# Patient Record
Sex: MALE | Race: BLACK OR AFRICAN AMERICAN | NOT HISPANIC OR LATINO | Employment: FULL TIME | URBAN - METROPOLITAN AREA
[De-identification: names, ages, dates, MRNs, and addresses within clinical notes are randomized per-mention and may not be internally consistent; named-entity substitution may affect disease eponyms.]

---

## 2022-10-16 ENCOUNTER — HOSPITAL ENCOUNTER (EMERGENCY)
Facility: HOSPITAL | Age: 47
Discharge: HOME | End: 2022-10-16
Attending: STUDENT IN AN ORGANIZED HEALTH CARE EDUCATION/TRAINING PROGRAM
Payer: MEDICAID

## 2022-10-16 VITALS
HEART RATE: 66 BPM | SYSTOLIC BLOOD PRESSURE: 130 MMHG | BODY MASS INDEX: 40.4 KG/M2 | RESPIRATION RATE: 18 BRPM | OXYGEN SATURATION: 99 % | WEIGHT: 257.94 LBS | DIASTOLIC BLOOD PRESSURE: 78 MMHG | TEMPERATURE: 97 F

## 2022-10-16 DIAGNOSIS — N50.811 PAIN IN BOTH TESTICLES: ICD-10-CM

## 2022-10-16 DIAGNOSIS — Z20.2 POSSIBLE EXPOSURE TO STD: ICD-10-CM

## 2022-10-16 DIAGNOSIS — N50.812 PAIN IN BOTH TESTICLES: ICD-10-CM

## 2022-10-16 DIAGNOSIS — M54.50 ACUTE BILATERAL LOW BACK PAIN WITHOUT SCIATICA: Primary | ICD-10-CM

## 2022-10-16 LAB
BILIRUB UR QL STRIP: NEGATIVE
CLARITY UR: CLEAR
COLOR UR: YELLOW
GLUCOSE UR QL STRIP: NEGATIVE
HGB UR QL STRIP: ABNORMAL
KETONES UR QL STRIP: NEGATIVE
LEUKOCYTE ESTERASE UR QL STRIP: NEGATIVE
NITRITE UR QL STRIP: NEGATIVE
PH UR STRIP: 6 [PH] (ref 5–8)
PROT UR QL STRIP: NEGATIVE
SP GR UR STRIP: 1.02 (ref 1–1.03)
URN SPEC COLLECT METH UR: ABNORMAL
UROBILINOGEN UR STRIP-ACNC: 1 EU/DL

## 2022-10-16 PROCEDURE — 81003 URINALYSIS AUTO W/O SCOPE: CPT | Performed by: STUDENT IN AN ORGANIZED HEALTH CARE EDUCATION/TRAINING PROGRAM

## 2022-10-16 PROCEDURE — 87591 N.GONORRHOEAE DNA AMP PROB: CPT | Performed by: STUDENT IN AN ORGANIZED HEALTH CARE EDUCATION/TRAINING PROGRAM

## 2022-10-16 PROCEDURE — 99284 EMERGENCY DEPT VISIT MOD MDM: CPT

## 2022-10-16 PROCEDURE — 96372 THER/PROPH/DIAG INJ SC/IM: CPT | Performed by: STUDENT IN AN ORGANIZED HEALTH CARE EDUCATION/TRAINING PROGRAM

## 2022-10-16 PROCEDURE — 63600175 PHARM REV CODE 636 W HCPCS: Performed by: STUDENT IN AN ORGANIZED HEALTH CARE EDUCATION/TRAINING PROGRAM

## 2022-10-16 PROCEDURE — 87491 CHLMYD TRACH DNA AMP PROBE: CPT | Performed by: STUDENT IN AN ORGANIZED HEALTH CARE EDUCATION/TRAINING PROGRAM

## 2022-10-16 RX ORDER — CYCLOBENZAPRINE HCL 10 MG
10 TABLET ORAL 3 TIMES DAILY PRN
Qty: 15 TABLET | Refills: 0 | Status: SHIPPED | OUTPATIENT
Start: 2022-10-16 | End: 2022-10-21

## 2022-10-16 RX ORDER — IBUPROFEN 800 MG/1
800 TABLET ORAL EVERY 6 HOURS PRN
Qty: 20 TABLET | Refills: 0 | Status: SHIPPED | OUTPATIENT
Start: 2022-10-16

## 2022-10-16 RX ORDER — CEFTRIAXONE 1 G/1
1 INJECTION, POWDER, FOR SOLUTION INTRAMUSCULAR; INTRAVENOUS
Status: COMPLETED | OUTPATIENT
Start: 2022-10-16 | End: 2022-10-16

## 2022-10-16 RX ORDER — KETOROLAC TROMETHAMINE 30 MG/ML
15 INJECTION, SOLUTION INTRAMUSCULAR; INTRAVENOUS
Status: COMPLETED | OUTPATIENT
Start: 2022-10-16 | End: 2022-10-16

## 2022-10-16 RX ORDER — DOXYCYCLINE 100 MG/1
100 CAPSULE ORAL 2 TIMES DAILY
Qty: 20 CAPSULE | Refills: 0 | Status: SHIPPED | OUTPATIENT
Start: 2022-10-16 | End: 2022-10-26

## 2022-10-16 RX ADMIN — KETOROLAC TROMETHAMINE 15 MG: 30 INJECTION, SOLUTION INTRAMUSCULAR; INTRAVENOUS at 08:10

## 2022-10-16 RX ADMIN — CEFTRIAXONE SODIUM 1 G: 1 INJECTION, POWDER, FOR SOLUTION INTRAMUSCULAR; INTRAVENOUS at 08:10

## 2022-10-17 LAB
C TRACH DNA SPEC QL NAA+PROBE: NOT DETECTED
N GONORRHOEA DNA SPEC QL NAA+PROBE: NOT DETECTED

## 2022-10-17 NOTE — ED PROVIDER NOTES
Encounter Date: 10/16/2022    This document was partially completed using speech recognition software and may contain misspellings, grammatical errors, and/or unexpected word substitutions.       History     Chief Complaint   Patient presents with    Back Pain     Patient to ER CC of lower back pain for a month, denies any trauma      47 year old male presents to the ED with bilateral lower back pain and bilateral testicular pain for the past month. 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 No falls, no traumas, no car wrecks. States he was sexually active with a female partner and did use condoms. No dysuria, frequency, difficulty urinating, hematuria. States both testicles hurt and ache. No trauma to  the scrotum. When asked if there was concern for possible STD, patient states it's possible.      Review of patient's allergies indicates:  No Known Allergies  History reviewed. No pertinent past medical history.  History reviewed. No pertinent surgical history.  History reviewed. No pertinent family history.  Social History     Tobacco Use    Smoking status: Never    Smokeless tobacco: Current   Substance Use Topics    Alcohol use: Yes     Comment: socially    Drug use: No     Review of Systems   Constitutional:  Negative for chills and fever.   HENT:  Negative for congestion, rhinorrhea and sneezing.    Eyes:  Negative for discharge and redness.   Respiratory:  Negative for cough and shortness of breath.    Cardiovascular:  Negative for chest pain and palpitations.   Gastrointestinal:  Negative for abdominal pain, diarrhea and vomiting.   Genitourinary:  Positive for testicular pain. Negative for difficulty urinating, flank pain and urgency.   Musculoskeletal:  Positive for back pain. Negative for neck pain.   Skin:  Negative for rash and wound.   Neurological:  Negative for weakness, numbness and headaches.     Physical Exam     Initial Vitals [10/16/22 2023]   BP Pulse Resp  Temp SpO2   130/78 66 18 96.9 °F (36.1 °C) 99 %      MAP       --         Physical Exam    Nursing note and vitals reviewed.  Constitutional: He appears well-developed. He is not diaphoretic. No distress.   HENT:   Head: Normocephalic and atraumatic.   Right Ear: External ear normal.   Left Ear: External ear normal.   Nose: Nose normal.   Eyes: Conjunctivae are normal. Right eye exhibits no discharge. Left eye exhibits no discharge. No scleral icterus.   Cardiovascular:  Normal rate and regular rhythm.           Pulmonary/Chest: Breath sounds normal. No stridor. No respiratory distress. He has no wheezes. He has no rhonchi. He has no rales.   Abdominal: Abdomen is soft. He exhibits no distension. There is no abdominal tenderness. There is no guarding.   Genitourinary: Cremasteric reflex is present. Right testis shows tenderness. Right testis shows no mass and no swelling. Left testis shows tenderness. Left testis shows no mass and no swelling. Uncircumcised.   Musculoskeletal:         General: No edema.      Lumbar back: Tenderness present. No bony tenderness.      Comments: Ambulatory     Neurological: He is alert and oriented to person, place, and time. GCS score is 15. GCS eye subscore is 4. GCS verbal subscore is 5. GCS motor subscore is 6.   Skin: Skin is warm and dry. Capillary refill takes less than 2 seconds.   Psychiatric: He has a normal mood and affect.       ED Course   Procedures  Labs Reviewed   URINALYSIS, REFLEX TO URINE CULTURE - Abnormal; Notable for the following components:       Result Value    Occult Blood UA Trace (*)     All other components within normal limits    Narrative:     Specimen Source->Urine   C. TRACHOMATIS/N. GONORRHOEAE BY AMP DNA          Imaging Results    None          Medications   ketorolac injection 15 mg (has no administration in time range)   cefTRIAXone injection 1 g (has no administration in time range)     Medical Decision Making:   Differential Diagnosis:   Ddx: DUC  back pain, STD, testicular torsion, orchitis, epidimitis    ED Management:  Based on the patient's evaluation - patient appears well for discharge home. Atraumatic low back pain - treating with toradol and discharging with ibuprofen, flexeril, supportive care. In regards to his bilateral testicular pain for a week - low concern for bilateral testicular torsion. Was sexually active with potential concern for STD - neg UA but pending gonorrhea/chlamydia results - patient elects with empiric treatment with ceftriaxone and doxycycline instead of waiting for those results. PCP f/u advised. Patient is in agreement.                        Clinical Impression:   Final diagnoses:  [M54.50] Acute bilateral low back pain without sciatica (Primary)  [N50.811, N50.812] Pain in both testicles  [Z20.2] Possible exposure to STD      ED Disposition Condition    Discharge Stable          ED Prescriptions       Medication Sig Dispense Start Date End Date Auth. Provider    ibuprofen (ADVIL,MOTRIN) 800 MG tablet Take 1 tablet (800 mg total) by mouth every 6 (six) hours as needed for Pain. 20 tablet 10/16/2022 -- Morro Sky DO    cyclobenzaprine (FLEXERIL) 10 MG tablet Take 1 tablet (10 mg total) by mouth 3 (three) times daily as needed for Muscle spasms. 15 tablet 10/16/2022 10/21/2022 Morro kSy DO    doxycycline (VIBRAMYCIN) 100 MG Cap Take 1 capsule (100 mg total) by mouth 2 (two) times daily. for 10 days 20 capsule 10/16/2022 10/26/2022 Morro Sky DO          Follow-up Information       Follow up With Specialties Details Why Contact Info    Your primary care provider  Schedule an appointment as soon as possible for a visit in 1 week As needed              Morro Sky DO  10/16/22 2050

## 2023-06-17 ENCOUNTER — HOSPITAL ENCOUNTER (EMERGENCY)
Facility: HOSPITAL | Age: 48
Discharge: HOME | End: 2023-06-17
Attending: EMERGENCY MEDICINE
Payer: MEDICAID

## 2023-06-17 VITALS
DIASTOLIC BLOOD PRESSURE: 89 MMHG | HEART RATE: 76 BPM | TEMPERATURE: 98 F | OXYGEN SATURATION: 100 % | SYSTOLIC BLOOD PRESSURE: 159 MMHG | RESPIRATION RATE: 18 BRPM

## 2023-06-17 DIAGNOSIS — M25.511 ACUTE PAIN OF RIGHT SHOULDER: Primary | ICD-10-CM

## 2023-06-17 PROCEDURE — 99284 EMERGENCY DEPT VISIT MOD MDM: CPT

## 2023-06-17 RX ORDER — CYCLOBENZAPRINE HCL 10 MG
10 TABLET ORAL 3 TIMES DAILY PRN
Qty: 15 TABLET | Refills: 0 | Status: SHIPPED | OUTPATIENT
Start: 2023-06-17 | End: 2023-06-22

## 2023-06-17 RX ORDER — KETOROLAC TROMETHAMINE 10 MG/1
10 TABLET, FILM COATED ORAL EVERY 6 HOURS
Qty: 20 TABLET | Refills: 0 | Status: SHIPPED | OUTPATIENT
Start: 2023-06-17 | End: 2023-06-22

## 2023-06-17 NOTE — ED PROVIDER NOTES
Encounter Date: 6/17/2023       History     Chief Complaint   Patient presents with    underarm pain      Patient states everytime he lays down on the right side or puts right arm too high its sends a shock x2 weeks     48-year-old male presents to the emergency room with right shoulder pain for the last 2 weeks.  Patient states when he raises his right arm above shoulder line he received a shocking pain.  Denies any trauma, heavy lifting, injury.  Patient states worsens when he tries to sleep on his right shoulder or raises his right arm above shoulder line.      Review of patient's allergies indicates:  No Known Allergies  No past medical history on file.  No past surgical history on file.  No family history on file.  Social History     Tobacco Use    Smoking status: Never    Smokeless tobacco: Current   Substance Use Topics    Alcohol use: Yes     Comment: socially    Drug use: No     Review of Systems   Constitutional:  Negative for fever.   HENT:  Negative for sore throat.    Respiratory:  Negative for shortness of breath.    Cardiovascular:  Negative for chest pain.   Gastrointestinal:  Negative for nausea.   Genitourinary:  Negative for dysuria.   Musculoskeletal:  Positive for arthralgias. Negative for back pain.   Skin:  Negative for rash.   Neurological:  Negative for weakness.   Hematological:  Does not bruise/bleed easily.   All other systems reviewed and are negative.    Physical Exam     Initial Vitals [06/17/23 1341]   BP Pulse Resp Temp SpO2   (!) 159/89 76 18 98 °F (36.7 °C) 100 %      MAP       --         Physical Exam    Nursing note and vitals reviewed.  Constitutional: He appears well-developed and well-nourished.   HENT:   Head: Normocephalic and atraumatic.   Eyes: Pupils are equal, round, and reactive to light.   Abdominal: Abdomen is soft.   Musculoskeletal:      Comments: Pain elicited with range of motion of right arm above shoulder line and crossover maneuver.  Full range of motion      Neurological: He is alert and oriented to person, place, and time.   Psychiatric: He has a normal mood and affect.       ED Course   Procedures  Labs Reviewed - No data to display       Imaging Results    None          Medications - No data to display  Medical Decision Making:   Differential Diagnosis:   Rotator cuff injury, pinched nerve, paresthesia, right shoulder pain                        Clinical Impression:   Final diagnoses:  [M25.511] Acute pain of right shoulder (Primary)        ED Disposition Condition    Discharge Stable          ED Prescriptions       Medication Sig Dispense Start Date End Date Auth. Provider    cyclobenzaprine (FLEXERIL) 10 MG tablet Take 1 tablet (10 mg total) by mouth 3 (three) times daily as needed for Muscle spasms. 15 tablet 6/17/2023 6/22/2023 Gisela Cheney NP    ketorolac (TORADOL) 10 mg tablet Take 1 tablet (10 mg total) by mouth every 6 (six) hours. for 5 days 20 tablet 6/17/2023 6/22/2023 Gisela Cheney NP          Follow-up Information    None          Gisela Cheney NP  06/17/23 8245

## 2024-05-30 ENCOUNTER — HOSPITAL ENCOUNTER (EMERGENCY)
Facility: HOSPITAL | Age: 49
Discharge: HOME | End: 2024-05-30
Attending: SURGERY
Payer: COMMERCIAL

## 2024-05-30 VITALS
OXYGEN SATURATION: 98 % | TEMPERATURE: 99 F | SYSTOLIC BLOOD PRESSURE: 128 MMHG | HEART RATE: 60 BPM | WEIGHT: 264.69 LBS | DIASTOLIC BLOOD PRESSURE: 69 MMHG | BODY MASS INDEX: 41.45 KG/M2 | RESPIRATION RATE: 16 BRPM

## 2024-05-30 DIAGNOSIS — M25.552 LEFT HIP PAIN: ICD-10-CM

## 2024-05-30 DIAGNOSIS — F45.41 PSYCHOGENIC PAIN: Primary | ICD-10-CM

## 2024-05-30 DIAGNOSIS — F14.10 COCAINE ABUSE: ICD-10-CM

## 2024-05-30 DIAGNOSIS — L73.2 HIDRADENITIS SUPPURATIVA OF RIGHT AXILLA: ICD-10-CM

## 2024-05-30 DIAGNOSIS — M79.601 PAIN OF RIGHT ARM: ICD-10-CM

## 2024-05-30 LAB
ALBUMIN SERPL BCP-MCNC: 3.3 G/DL (ref 3.5–5.2)
ALP SERPL-CCNC: 59 U/L (ref 55–135)
ALT SERPL W/O P-5'-P-CCNC: 19 U/L (ref 10–44)
AMPHET+METHAMPHET UR QL: NEGATIVE
ANION GAP SERPL CALC-SCNC: 8 MMOL/L (ref 8–16)
AST SERPL-CCNC: 14 U/L (ref 10–40)
BARBITURATES UR QL SCN>200 NG/ML: NEGATIVE
BASOPHILS # BLD AUTO: 0.04 K/UL (ref 0–0.2)
BASOPHILS NFR BLD: 0.5 % (ref 0–1.9)
BENZODIAZ UR QL SCN>200 NG/ML: NEGATIVE
BILIRUB SERPL-MCNC: 0.5 MG/DL (ref 0.1–1)
BILIRUB UR QL STRIP: NEGATIVE
BNP SERPL-MCNC: 24 PG/ML (ref 0–99)
BUN SERPL-MCNC: 18 MG/DL (ref 6–20)
BZE UR QL SCN: ABNORMAL
CALCIUM SERPL-MCNC: 9.1 MG/DL (ref 8.7–10.5)
CANNABINOIDS UR QL SCN: ABNORMAL
CHLORIDE SERPL-SCNC: 108 MMOL/L (ref 95–110)
CLARITY UR: CLEAR
CO2 SERPL-SCNC: 25 MMOL/L (ref 23–29)
COLOR UR: YELLOW
CREAT SERPL-MCNC: 1 MG/DL (ref 0.5–1.4)
CREAT UR-MCNC: 153.3 MG/DL (ref 23–375)
DIFFERENTIAL METHOD BLD: NORMAL
EOSINOPHIL # BLD AUTO: 0.2 K/UL (ref 0–0.5)
EOSINOPHIL NFR BLD: 2.9 % (ref 0–8)
ERYTHROCYTE [DISTWIDTH] IN BLOOD BY AUTOMATED COUNT: 12.8 % (ref 11.5–14.5)
EST. GFR  (NO RACE VARIABLE): >60 ML/MIN/1.73 M^2
GLUCOSE SERPL-MCNC: 88 MG/DL (ref 70–110)
GLUCOSE UR QL STRIP: NEGATIVE
HCT VFR BLD AUTO: 42.6 % (ref 40–54)
HGB BLD-MCNC: 14 G/DL (ref 14–18)
HGB UR QL STRIP: ABNORMAL
IMM GRANULOCYTES # BLD AUTO: 0.01 K/UL (ref 0–0.04)
IMM GRANULOCYTES NFR BLD AUTO: 0.1 % (ref 0–0.5)
KETONES UR QL STRIP: NEGATIVE
LEUKOCYTE ESTERASE UR QL STRIP: NEGATIVE
LIPASE SERPL-CCNC: 301 U/L (ref 4–60)
LYMPHOCYTES # BLD AUTO: 3.5 K/UL (ref 1–4.8)
LYMPHOCYTES NFR BLD: 44.1 % (ref 18–48)
MCH RBC QN AUTO: 30 PG (ref 27–31)
MCHC RBC AUTO-ENTMCNC: 32.9 G/DL (ref 32–36)
MCV RBC AUTO: 91 FL (ref 82–98)
METHADONE UR QL SCN>300 NG/ML: NEGATIVE
MONOCYTES # BLD AUTO: 0.7 K/UL (ref 0.3–1)
MONOCYTES NFR BLD: 8.3 % (ref 4–15)
NEUTROPHILS # BLD AUTO: 3.5 K/UL (ref 1.8–7.7)
NEUTROPHILS NFR BLD: 44.1 % (ref 38–73)
NITRITE UR QL STRIP: NEGATIVE
NRBC BLD-RTO: 0 /100 WBC
OPIATES UR QL SCN: NEGATIVE
PCP UR QL SCN>25 NG/ML: NEGATIVE
PH UR STRIP: 6 [PH] (ref 5–8)
PLATELET # BLD AUTO: 243 K/UL (ref 150–450)
PMV BLD AUTO: 10 FL (ref 9.2–12.9)
POTASSIUM SERPL-SCNC: 3.8 MMOL/L (ref 3.5–5.1)
PROT SERPL-MCNC: 7 G/DL (ref 6–8.4)
PROT UR QL STRIP: NEGATIVE
RBC # BLD AUTO: 4.66 M/UL (ref 4.6–6.2)
SODIUM SERPL-SCNC: 141 MMOL/L (ref 136–145)
SP GR UR STRIP: 1.02 (ref 1–1.03)
TOXICOLOGY INFORMATION: ABNORMAL
TROPONIN I SERPL DL<=0.01 NG/ML-MCNC: <0.006 NG/ML (ref 0–0.03)
URN SPEC COLLECT METH UR: ABNORMAL
UROBILINOGEN UR STRIP-ACNC: 1 EU/DL
WBC # BLD AUTO: 7.91 K/UL (ref 3.9–12.7)

## 2024-05-30 PROCEDURE — 99900031 HC PATIENT EDUCATION (STAT)

## 2024-05-30 PROCEDURE — 81003 URINALYSIS AUTO W/O SCOPE: CPT | Mod: 59 | Performed by: SURGERY

## 2024-05-30 PROCEDURE — 63600175 PHARM REV CODE 636 W HCPCS: Performed by: NURSE PRACTITIONER

## 2024-05-30 PROCEDURE — 80053 COMPREHEN METABOLIC PANEL: CPT | Performed by: SURGERY

## 2024-05-30 PROCEDURE — 84484 ASSAY OF TROPONIN QUANT: CPT | Performed by: SURGERY

## 2024-05-30 PROCEDURE — 93005 ELECTROCARDIOGRAM TRACING: CPT

## 2024-05-30 PROCEDURE — 99285 EMERGENCY DEPT VISIT HI MDM: CPT | Mod: 25

## 2024-05-30 PROCEDURE — 93010 ELECTROCARDIOGRAM REPORT: CPT | Mod: ,,, | Performed by: INTERNAL MEDICINE

## 2024-05-30 PROCEDURE — 85025 COMPLETE CBC W/AUTO DIFF WBC: CPT | Performed by: SURGERY

## 2024-05-30 PROCEDURE — 83880 ASSAY OF NATRIURETIC PEPTIDE: CPT | Performed by: SURGERY

## 2024-05-30 PROCEDURE — 83690 ASSAY OF LIPASE: CPT | Performed by: SURGERY

## 2024-05-30 PROCEDURE — 80307 DRUG TEST PRSMV CHEM ANLYZR: CPT | Performed by: SURGERY

## 2024-05-30 PROCEDURE — 99900035 HC TECH TIME PER 15 MIN (STAT)

## 2024-05-30 PROCEDURE — 96372 THER/PROPH/DIAG INJ SC/IM: CPT | Performed by: NURSE PRACTITIONER

## 2024-05-30 RX ORDER — CLINDAMYCIN HYDROCHLORIDE 300 MG/1
300 CAPSULE ORAL EVERY 6 HOURS
Qty: 28 CAPSULE | Refills: 0 | Status: SHIPPED | OUTPATIENT
Start: 2024-05-30 | End: 2024-06-06

## 2024-05-30 RX ORDER — KETOROLAC TROMETHAMINE 30 MG/ML
30 INJECTION, SOLUTION INTRAMUSCULAR; INTRAVENOUS
Status: COMPLETED | OUTPATIENT
Start: 2024-05-30 | End: 2024-05-30

## 2024-05-30 RX ORDER — TIZANIDINE 4 MG/1
4 TABLET ORAL EVERY 8 HOURS PRN
Qty: 12 TABLET | Refills: 0 | Status: SHIPPED | OUTPATIENT
Start: 2024-05-30

## 2024-05-30 RX ORDER — NAPROXEN 500 MG/1
500 TABLET ORAL EVERY 12 HOURS PRN
Qty: 10 TABLET | Refills: 0 | Status: SHIPPED | OUTPATIENT
Start: 2024-05-30

## 2024-05-30 RX ADMIN — KETOROLAC TROMETHAMINE 30 MG: 30 INJECTION INTRAMUSCULAR; INTRAVENOUS at 09:05

## 2024-05-31 LAB
OHS QRS DURATION: 74 MS
OHS QTC CALCULATION: 363 MS

## 2024-05-31 NOTE — ED PROVIDER NOTES
Encounter Date: 5/30/2024       History     Chief Complaint   Patient presents with    Arm Pain     PT TO ER WITH C/O RIGHT AXILLARY PAIN AND AND LEFT HIP PAIN.      Popeye Correa is a 49 y.o. male  with PMH of RA presenting to the ED for evaluation of right axillary pain and left hip pain.   Patient presents with pain to his right axilla that started 2-3 days ago.  He denies any injury or lesions.  Pain is described as aching, exacerbated by light touch, currently rated 5/10 in severity.  He also presents with left hip pain that is chronic.  He reports that he has a history of RA.  He denies any new injury or trauma.  Pain is described as aching, exacerbated by movement and weight-bearing, currently rated 8/10 in severity.  He denies numbness or tingling to left lower extremity.    The history is provided by the patient.     Review of patient's allergies indicates:  No Known Allergies  Past Medical History:   Diagnosis Date    Rheumatoid arthritis, unspecified      No past surgical history on file.  No family history on file.  Social History     Tobacco Use    Smoking status: Never    Smokeless tobacco: Current   Substance Use Topics    Alcohol use: Yes     Comment: socially    Drug use: No     Review of Systems   Constitutional:  Negative for appetite change, chills and fever.   HENT:  Negative for congestion, ear discharge, ear pain, postnasal drip, rhinorrhea and sore throat.    Respiratory:  Negative for cough, chest tightness and shortness of breath.    Cardiovascular:  Negative for chest pain.   Gastrointestinal:  Negative for abdominal distention, abdominal pain and nausea.   Genitourinary:  Negative for dysuria, flank pain, hematuria and urgency.   Musculoskeletal:  Positive for arthralgias (Left hip, right axilla). Negative for back pain.   Skin: Negative.  Negative for rash.   Neurological:  Negative for dizziness, weakness, numbness and headaches.   Hematological:  Does not bruise/bleed easily.        Physical Exam     Initial Vitals [05/30/24 1906]   BP Pulse Resp Temp SpO2   128/69 60 16 98.7 °F (37.1 °C) 98 %      MAP       --         Physical Exam    Nursing note and vitals reviewed.  Constitutional: He appears well-developed and well-nourished.   HENT:   Head: Normocephalic and atraumatic.   Eyes: Conjunctivae and EOM are normal. Pupils are equal, round, and reactive to light.   Neck: Neck supple.   Cardiovascular:  Normal rate, regular rhythm, normal heart sounds and intact distal pulses.           Pulmonary/Chest: Breath sounds normal.   Abdominal: Abdomen is soft. Bowel sounds are normal.   Musculoskeletal:      Cervical back: Neck supple.      Left hip: Tenderness present. Decreased range of motion.     Neurological: He is alert and oriented to person, place, and time. He has normal strength.   Skin: Skin is warm and dry.   R axilla with small areas of induration without obvious lesions or fluctuance   Psychiatric: He has a normal mood and affect. His behavior is normal. Judgment and thought content normal.         ED Course   Procedures  Labs Reviewed   COMPREHENSIVE METABOLIC PANEL - Abnormal; Notable for the following components:       Result Value    Albumin 3.3 (*)     All other components within normal limits   LIPASE - Abnormal; Notable for the following components:    Lipase 301 (*)     All other components within normal limits   URINALYSIS, REFLEX TO URINE CULTURE - Abnormal; Notable for the following components:    Occult Blood UA Trace (*)     All other components within normal limits    Narrative:     Specimen Source->Urine   DRUG SCREEN PANEL, URINE EMERGENCY - Abnormal; Notable for the following components:    Cocaine (Metab.) Presumptive Positive (*)     THC Presumptive Positive (*)     All other components within normal limits    Narrative:     Specimen Source->Urine   CBC W/ AUTO DIFFERENTIAL   TROPONIN I   B-TYPE NATRIURETIC PEPTIDE          Imaging Results              X-Ray Hip  2 or 3 views Left with Pelvis when performed (Final result)  Result time 05/30/24 19:54:35      Final result by Patrice Hopkins,  (05/30/24 19:54:35)                   Impression:      No acute fracture or dislocation.      Electronically signed by: Patrice Hopkins  Date:    05/30/2024  Time:    19:54               Narrative:    EXAMINATION:  XR HIP WITH PELVIS WHEN PERFORMED 2 OR 3 VIEWS LEFT    CLINICAL HISTORY:  Pain in left hip    TECHNIQUE:  AP view of the pelvis and frog leg lateral view of the left hip were performed.    COMPARISON:  None    FINDINGS:  There is no acute fracture or dislocation.  Alignment is normal.  The femoral heads are well seated in the acetabula.  Joint spaces are preserved.                                       Medications   ketorolac injection 30 mg (has no administration in time range)     Medical Decision Making   Evaluation of a 49-year-old male with right axilla pain and left hip pain.   Right axilla with small areas of induration, likely hidradenitis.  No active drainage   Chronic left hip pain with no new injury or trauma. +  mild tenderness.  Good distal pulses.   Differential diagnosis includes hidradenitis, abscess, cellulitis,  bursitis, osteoarthritis    Amount and/or Complexity of Data Reviewed  Labs: ordered. Decision-making details documented in ED Course.  Radiology: ordered. Decision-making details documented in ED Course.  ECG/medicine tests: ordered and independent interpretation performed.     Details:  Sinus bradycardia, rate 55.  Normal TX and QRS intervals.  No STEMI.    No previous EKG for comparison    Risk  Prescription drug management.  Risk Details:  Stable for discharge home.  Patient presented with right axilla pain and atraumatic left hip pain.  Patient concerned that he is having heart issues due to pain in his axilla.  On exam, he has obvious hidradenitis;  but given concern, cardiac workup was completed that is negative.  Left hip x-ray shows no acute  findings.  He was given Toradol for pain control and will be discharged home with clindamycin for hidradenitis, tizanidine, and naproxen for pain control.  Patient also tested positive for cocaine.   Patient extensively counseled on adverse effects of drug use. Patient/caregiver voices understanding and feels comfortable with discharge plan.      The patient acknowledges that close follow up with medical provider is required. Instructed to follow up with PCP within 2 days. Patient was given specific return precautions. The patient agrees to comply with all instruction and directions given in the ER.                                        Clinical Impression:  Final diagnoses:  [M79.601] Pain of right arm  [M25.552] Left hip pain  [L73.2] Hidradenitis suppurativa of right axilla (Primary)  [F14.10] Cocaine abuse          ED Disposition Condition    Discharge Stable          ED Prescriptions       Medication Sig Dispense Start Date End Date Auth. Provider    tiZANidine (ZANAFLEX) 4 MG tablet Take 1 tablet (4 mg total) by mouth every 8 (eight) hours as needed (pain). 12 tablet 5/30/2024 -- Roya Jurado NP    naproxen (NAPROSYN) 500 MG tablet Take 1 tablet (500 mg total) by mouth every 12 (twelve) hours as needed (pain). Take with food. 10 tablet 5/30/2024 -- Roya Jurado NP    clindamycin (CLEOCIN) 300 MG capsule Take 1 capsule (300 mg total) by mouth every 6 (six) hours. for 7 days 28 capsule 5/30/2024 6/6/2024 Roya Juraod NP          Follow-up Information       Follow up With Specialties Details Why Contact Info    PCP  Schedule an appointment as soon as possible for a visit in 2 days               Roya Jurado NP  05/30/24 3417

## 2025-03-28 ENCOUNTER — HOSPITAL ENCOUNTER (EMERGENCY)
Facility: HOSPITAL | Age: 50
Discharge: HOME | End: 2025-03-28
Attending: STUDENT IN AN ORGANIZED HEALTH CARE EDUCATION/TRAINING PROGRAM
Payer: COMMERCIAL

## 2025-03-28 VITALS
BODY MASS INDEX: 34.56 KG/M2 | DIASTOLIC BLOOD PRESSURE: 79 MMHG | OXYGEN SATURATION: 97 % | SYSTOLIC BLOOD PRESSURE: 128 MMHG | HEART RATE: 84 BPM | TEMPERATURE: 100 F | RESPIRATION RATE: 22 BRPM | WEIGHT: 220.69 LBS

## 2025-03-28 DIAGNOSIS — B34.9 VIRAL SYNDROME: Primary | ICD-10-CM

## 2025-03-28 LAB
INFLUENZA A MOLECULAR (OHS): NEGATIVE
INFLUENZA B MOLECULAR (OHS): NEGATIVE
SARS-COV-2 RDRP RESP QL NAA+PROBE: NEGATIVE

## 2025-03-28 PROCEDURE — U0002 COVID-19 LAB TEST NON-CDC: HCPCS | Performed by: STUDENT IN AN ORGANIZED HEALTH CARE EDUCATION/TRAINING PROGRAM

## 2025-03-28 PROCEDURE — 99284 EMERGENCY DEPT VISIT MOD MDM: CPT | Mod: 25

## 2025-03-28 PROCEDURE — 96372 THER/PROPH/DIAG INJ SC/IM: CPT

## 2025-03-28 PROCEDURE — 87502 INFLUENZA DNA AMP PROBE: CPT | Performed by: STUDENT IN AN ORGANIZED HEALTH CARE EDUCATION/TRAINING PROGRAM

## 2025-03-28 PROCEDURE — 63600175 PHARM REV CODE 636 W HCPCS: Mod: JZ,TB

## 2025-03-28 RX ORDER — METHYLPREDNISOLONE 4 MG/1
TABLET ORAL
Qty: 21 EACH | Refills: 0 | Status: SHIPPED | OUTPATIENT
Start: 2025-03-28

## 2025-03-28 RX ORDER — FLUTICASONE PROPIONATE 50 MCG
1 SPRAY, SUSPENSION (ML) NASAL 2 TIMES DAILY PRN
Qty: 15 G | Refills: 1 | Status: SHIPPED | OUTPATIENT
Start: 2025-03-28 | End: 2025-04-27

## 2025-03-28 RX ORDER — DICLOFENAC SODIUM 50 MG/1
50 TABLET, DELAYED RELEASE ORAL 3 TIMES DAILY PRN
Qty: 15 TABLET | Refills: 0 | Status: SHIPPED | OUTPATIENT
Start: 2025-03-28

## 2025-03-28 RX ORDER — KETOROLAC TROMETHAMINE 30 MG/ML
30 INJECTION, SOLUTION INTRAMUSCULAR; INTRAVENOUS
Status: COMPLETED | OUTPATIENT
Start: 2025-03-28 | End: 2025-03-28

## 2025-03-28 RX ADMIN — KETOROLAC TROMETHAMINE 30 MG: 30 INJECTION, SOLUTION INTRAMUSCULAR at 03:03

## 2025-03-28 NOTE — ED PROVIDER NOTES
Encounter Date: 3/28/2025       History     Chief Complaint   Patient presents with    General Illness     Pt arrives to the ed with c/o no taste or smell and body aches x 2 days.     This note is dictated on M*Modal word recognition program.  There are word recognition mistakes and grammatical errors that are occasionally missed on review.     Popeye Correa is a 50 y.o. male with a history rheumatoid arthritis presents to ER today with complaints of body aches, chills, no taste or smell for the last 48 hours concerned he has COVID-19.  No shortness of breath no chest pain reported      The history is provided by the patient.     Review of patient's allergies indicates:  No Known Allergies  Past Medical History:   Diagnosis Date    Rheumatoid arthritis, unspecified      History reviewed. No pertinent surgical history.  No family history on file.  Social History[1]  Review of Systems   HENT:          Patient reports decreased sense of taste   Musculoskeletal:  Positive for arthralgias and myalgias.       Physical Exam     Initial Vitals [03/28/25 1413]   BP Pulse Resp Temp SpO2   (!) 149/93 97 20 98.2 °F (36.8 °C) 97 %      MAP       --         Physical Exam    Constitutional: He appears well-developed and well-nourished. He is not diaphoretic. No distress.   HENT:   Head: Normocephalic.   Throat is erythemic   Eyes: Pupils are equal, round, and reactive to light.   Neck: Neck supple.   Normal range of motion.  Cardiovascular:  Normal rate, regular rhythm and normal heart sounds.           Pulmonary/Chest: Breath sounds normal. No respiratory distress. He has no wheezes.   Abdominal: Bowel sounds are normal.   Musculoskeletal:         General: No tenderness or edema.      Cervical back: Normal range of motion and neck supple.     Neurological: He is alert and oriented to person, place, and time. GCS score is 15. GCS eye subscore is 4. GCS verbal subscore is 5. GCS motor subscore is 6.   Skin: Capillary refill takes  less than 2 seconds. No rash noted. No erythema.   Psychiatric: He has a normal mood and affect. His behavior is normal. Judgment and thought content normal.         ED Course   Procedures  Labs Reviewed   INFLUENZA A & B BY MOLECULAR - Normal       Result Value    INFLUENZA A MOLECULAR Negative      INFLUENZA B MOLECULAR  Negative     SARS-COV-2 RNA AMPLIFICATION, QUAL - Normal    SARS COV-2 Molecular Negative            Imaging Results    None          Medications   ketorolac injection 30 mg (has no administration in time range)     Medical Decision Making  Differential diagnosis include COVID-19, influenza, viral upper respiratory infection, viral syndrome    Patient tested negative for COVID negative for influenza  Symptoms are consistent with viral syndrome causing body aches, chills and nasal congestion.  Will treat patient's symptomatically otherwise vital signs stable no hypoxia  Patient stable at time of discharge in no acute distress.  No life-threatening illnesses were found during ER visit today.  Patient was instructed to follow-up with PCP or other recommended specialist within the next 48-72 hours.  Patient was instructed to return to ER immediately for any worsening or concerning symptoms.  All discharge instructions discussed with patient, and patient agrees to comply with discharge instructions given today.     Risk  Prescription drug management.                                      Clinical Impression:  Final diagnoses:  [B34.9] Viral syndrome (Primary)          ED Disposition Condition    Discharge Stable          ED Prescriptions       Medication Sig Dispense Start Date End Date Auth. Provider    diclofenac (VOLTAREN) 50 MG EC tablet Take 1 tablet (50 mg total) by mouth 3 (three) times daily as needed (Pain). 15 tablet 3/28/2025 -- Neville Mosher NP    methylPREDNISolone (MEDROL DOSEPACK) 4 mg tablet use as directed 21 each 3/28/2025 -- Neville Mosher NP    fluticasone propionate (FLONASE) 50  mcg/actuation nasal spray 1 spray (50 mcg total) by Each Nostril route 2 (two) times daily as needed for Rhinitis or Allergies. 15 g 3/28/2025 4/27/2025 Neville Mosher, NP          Follow-up Information    None              [1]   Social History  Tobacco Use    Smoking status: Never    Smokeless tobacco: Current   Substance Use Topics    Alcohol use: Yes     Comment: socially    Drug use: No        Neville Mosher, NP  03/28/25 2128

## 2025-03-28 NOTE — Clinical Note
"Popeye"Yuridia Correa was seen and treated in our emergency department on 3/28/2025.  He may return to work on 03/31/2025.       If you have any questions or concerns, please don't hesitate to call.      Neville Mosher, NP"

## 2025-03-28 NOTE — DISCHARGE INSTRUCTIONS
Please alternate Tylenol and Motrin to help control body aches.  Your COVID and influenza test were negative today

## 2025-05-25 ENCOUNTER — HOSPITAL ENCOUNTER (EMERGENCY)
Facility: HOSPITAL | Age: 50
Discharge: HOME | End: 2025-05-25
Attending: SURGERY
Payer: COMMERCIAL

## 2025-05-25 VITALS
HEIGHT: 67 IN | SYSTOLIC BLOOD PRESSURE: 134 MMHG | BODY MASS INDEX: 40.69 KG/M2 | RESPIRATION RATE: 20 BRPM | OXYGEN SATURATION: 99 % | HEART RATE: 60 BPM | DIASTOLIC BLOOD PRESSURE: 95 MMHG | TEMPERATURE: 98 F | WEIGHT: 259.25 LBS

## 2025-05-25 DIAGNOSIS — S20.224A CONTUSION OF MIDDLE BACK WALL OF THORAX, INITIAL ENCOUNTER: Primary | ICD-10-CM
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
## 2025-05-25 PROCEDURE — 99283 EMERGENCY DEPT VISIT LOW MDM: CPT | Mod: 25

## 2025-05-25 PROCEDURE — 25000003 PHARM REV CODE 250: Performed by: SURGERY

## 2025-05-25 RX ORDER — IBUPROFEN 800 MG/1
800 TABLET, FILM COATED ORAL
Status: DISCONTINUED | OUTPATIENT
Start: 2025-05-25 | End: 2025-05-25 | Stop reason: HOSPADM

## 2025-05-25 RX ORDER — IBUPROFEN 800 MG/1
800 TABLET, FILM COATED ORAL EVERY 6 HOURS PRN
Qty: 20 TABLET | Refills: 0 | Status: SHIPPED | OUTPATIENT
Start: 2025-05-25

## 2025-05-25 NOTE — ED PROVIDER NOTES
Encounter Date: 5/25/2025       History     Chief Complaint   Patient presents with    Back Pain     Patient to ED alone complaining of upper back pain after a slip and fall on 5/22.     55-year-old man who fell last week at a store and hurt his upper back.  His pain is localized in 1 spot and he has pain when he uses his back muscles he has no respiratory symptoms or chest pain he suffered no other injuries during the fall      Back Pain   This is a new problem. The current episode started two days ago. The problem occurs intermittently. The problem has been unchanged. The pain is associated with falling. The pain is present in the thoracic spine. The quality of the pain is described as stabbing. The pain does not radiate. The pain is at a severity of 5/10. The symptoms are aggravated by bending and twisting.     Review of patient's allergies indicates:  No Known Allergies  Past Medical History:   Diagnosis Date    Rheumatoid arthritis, unspecified      History reviewed. No pertinent surgical history.  No family history on file.  Social History[1]  Review of Systems   Constitutional: Negative.    HENT: Negative.     Eyes: Negative.    Respiratory: Negative.     Cardiovascular: Negative.    Gastrointestinal:  Negative for abdominal distention and diarrhea.   Endocrine: Negative.    Genitourinary: Negative.    Musculoskeletal:  Positive for back pain.   Allergic/Immunologic: Negative.    Hematological: Negative.    Psychiatric/Behavioral: Negative.         Physical Exam     Initial Vitals [05/25/25 1149]   BP Pulse Resp Temp SpO2   127/81 73 20 98.3 °F (36.8 °C) 95 %      MAP       --         Physical Exam    Nursing note and vitals reviewed.  Constitutional: He appears well-developed and well-nourished.   HENT:   Head: Normocephalic.   Neck:   Normal range of motion.  Cardiovascular:  Normal rate.           Pulmonary/Chest: Breath sounds normal.   Posterior chest wall no rib tenderness he has localized tenderness  over T3-T4 vertebrae   Abdominal: Abdomen is soft.   Musculoskeletal:         General: Normal range of motion.      Cervical back: Normal range of motion.     Neurological: He is alert and oriented to person, place, and time. GCS score is 15. GCS eye subscore is 4. GCS verbal subscore is 5. GCS motor subscore is 6.   Skin: Skin is warm.         ED Course   Procedures  Labs Reviewed - No data to display       Imaging Results    None          Medications   ibuprofen tablet 800 mg (has no administration in time range)     Medical Decision Making  55-year-old man fell at a store last week and hurt his upper back his pain is localized in 1 area in his thoracic spine x-rays were negative we will treat with anti-inflammatories return as needed    Amount and/or Complexity of Data Reviewed  Radiology: ordered.    Risk  Prescription drug management.                                      Clinical Impression:  Final diagnoses:  [W19.XXXA] Fall                       [1]   Social History  Tobacco Use    Smoking status: Never    Smokeless tobacco: Current   Substance Use Topics    Alcohol use: Yes     Comment: socially    Drug use: No        ASHLEY Daily III, MD  05/25/25 [id number]

## 2025-05-25 NOTE — Clinical Note
"Popeye Arias" Sunny was seen and treated in our emergency department on 5/25/2025.  He may return to work on 05/27/2025.       If you have any questions or concerns, please don't hesitate to call.      Jackie Vick RN    "

## 2025-06-17 ENCOUNTER — HOSPITAL ENCOUNTER (EMERGENCY)
Facility: HOSPITAL | Age: 50
Discharge: HOME | End: 2025-06-17
Attending: SURGERY
Payer: COMMERCIAL

## 2025-06-17 VITALS
OXYGEN SATURATION: 97 % | SYSTOLIC BLOOD PRESSURE: 157 MMHG | WEIGHT: 262.69 LBS | DIASTOLIC BLOOD PRESSURE: 91 MMHG | HEART RATE: 70 BPM | RESPIRATION RATE: 20 BRPM | TEMPERATURE: 99 F | HEIGHT: 67 IN | BODY MASS INDEX: 41.23 KG/M2

## 2025-06-17 DIAGNOSIS — M54.6 ACUTE BILATERAL THORACIC BACK PAIN: Primary | ICD-10-CM

## 2025-06-17 PROCEDURE — 99284 EMERGENCY DEPT VISIT MOD MDM: CPT | Mod: 25

## 2025-06-17 RX ORDER — CYCLOBENZAPRINE HCL 10 MG
10 TABLET ORAL 3 TIMES DAILY PRN
Qty: 10 TABLET | Refills: 0 | Status: SHIPPED | OUTPATIENT
Start: 2025-06-17 | End: 2025-06-24

## 2025-06-17 RX ORDER — IBUPROFEN 800 MG/1
800 TABLET, FILM COATED ORAL EVERY 6 HOURS PRN
Qty: 20 TABLET | Refills: 0 | Status: SHIPPED | OUTPATIENT
Start: 2025-06-17

## 2025-06-17 RX ORDER — GABAPENTIN 100 MG/1
100 CAPSULE ORAL 3 TIMES DAILY
Qty: 90 CAPSULE | Refills: 11 | Status: SHIPPED | OUTPATIENT
Start: 2025-06-17 | End: 2025-06-24

## 2025-06-17 NOTE — ED TRIAGE NOTES
Pt arrived to ED c/o upper back pain and left arm pain that originally started at his last ER visit (pt was seen for a fall). Pt reports pain has continued, but he is taking muscle relaxers. Pt has followed up with pcp, but they did not order MRI and pt wants an MRI.

## 2025-06-17 NOTE — ED PROVIDER NOTES
Encounter Date: 6/17/2025       History     Chief Complaint   Patient presents with    Back Pain     Pt arrived to ED c/o upper back pain and left arm pain that originally started at his last ER visit (pt was seen for a fall). Pt reports pain has continued, but he is taking muscle relaxers. Pt has followed up with pcp, but they did not order MRI and pt wants an MRI.      History of Present Illness  Popeye Correa is a 50 y.o. male that presents with a upper back pain  Patient states he fell 3 weeks ago on May 25th 2025, seen in the ER now  Patient has a residual pain in the midthoracic area since that fall on history  Patient has a completely normal physical exam on ER evaluation this p.m.  Patient denies any head trauma or loss of consciousness on interview  Patient has a completely normal neuro exam with a normal motor exam    The history is provided by the patient.     Review of patient's allergies indicates:  No Known Allergies    Past Medical History:   Diagnosis Date    Rheumatoid arthritis, unspecified      History reviewed. No pertinent surgical history.  No family history on file.  Social History[1]    Review of Systems   Constitutional:  Negative for activity change, appetite change, fatigue, fever and unexpected weight change.   HENT:  Negative for congestion, ear pain, mouth sores, nosebleeds, rhinorrhea, sinus pressure, sneezing and sore throat.    Eyes:  Negative for pain, discharge, redness and itching.   Respiratory:  Negative for apnea, cough, chest tightness and shortness of breath.    Cardiovascular:  Negative for chest pain, palpitations and leg swelling.   Gastrointestinal:  Negative for abdominal distention, abdominal pain, anal bleeding, constipation, diarrhea, nausea and vomiting.   Endocrine: Negative.    Genitourinary:  Negative for dysuria, enuresis, flank pain and frequency.   Musculoskeletal:  Positive for back pain and neck pain. Negative for arthralgias and neck stiffness.   Skin:   Negative for color change and wound.   Allergic/Immunologic: Negative.    Neurological:  Negative for dizziness, tremors, syncope, facial asymmetry, speech difficulty, weakness, light-headedness, numbness and headaches.   Hematological:  Negative for adenopathy. Does not bruise/bleed easily.   Psychiatric/Behavioral:  Negative for agitation, behavioral problems, hallucinations, self-injury and suicidal ideas. The patient is not nervous/anxious.        Physical Exam     Initial Vitals [06/17/25 1529]   BP Pulse Resp Temp SpO2   (!) 176/116 80 20 99.5 °F (37.5 °C) (!) 94 %      MAP       --         Physical Exam    Nursing note and vitals reviewed.  Constitutional: Vital signs are normal. He appears well-developed and well-nourished. He is cooperative.   HENT:   Head: Normocephalic and atraumatic. Mouth/Throat: Uvula is midline and mucous membranes are normal.   Eyes: Conjunctivae, EOM and lids are normal. Pupils are equal, round, and reactive to light.   Neck: Neck supple.   Normal range of motion.   Full passive range of motion without pain.     Cardiovascular:  Normal rate, regular rhythm, S1 normal, S2 normal, normal heart sounds, intact distal pulses and normal pulses.           Pulmonary/Chest: Effort normal and breath sounds normal.   Abdominal: Abdomen is soft and flat. Bowel sounds are normal.   Musculoskeletal:         General: Normal range of motion.      Cervical back: Full passive range of motion without pain, normal range of motion and neck supple.     Neurological: He is alert and oriented to person, place, and time. He has normal strength.   Skin: Skin is warm, dry and intact. Capillary refill takes less than 2 seconds.         ED Course   Procedures  Labs Reviewed - No data to display       Imaging Results              CT Thoracic Spine Without Contrast (Final result)  Result time 06/17/25 17:48:08      Final result by Cristin Stephens MD (06/17/25 17:48:08)                   Impression:      No  acute findings      Electronically signed by: Cristin Stephens  Date:    06/17/2025  Time:    17:48               Narrative:    EXAMINATION:  CT THORACIC SPINE WITHOUT CONTRAST    CLINICAL HISTORY:  Back trauma, no prior imaging (Age >= 16y);    TECHNIQUE:  CT thoracic spine without contrast.  Coronal and sagittal reformatted images were obtained.    COMPARISON:  None    FINDINGS:  No acute fracture or listhesis.  Mild mid to lower thoracic discogenic disease.  No significant central canal or foraminal stenosis.                                       CT Cervical Spine Without Contrast (Final result)  Result time 06/17/25 17:46:12      Final result by Cristin Stephens MD (06/17/25 17:46:12)                   Impression:      Mild reversal normal cervical lordosis.  Correlate for positioning, strain, or spasm.      Electronically signed by: Cristin Stephens  Date:    06/17/2025  Time:    17:46               Narrative:    EXAMINATION:  CT CERVICAL SPINE WITHOUT CONTRAST    CLINICAL HISTORY:  Neck trauma, impaired ROM (Age 16-64y);    TECHNIQUE:  Low dose axial images, sagittal and coronal reformations were performed though the cervical spine.  Contrast was not administered.    COMPARISON:  None    FINDINGS:  Mild reversal normal cervical lordosis.  No acute fracture or listhesis. Intervertebral disc spaces and vertebral body heights are maintained. No significant spondylosis.  No significant central canal or foraminal stenosis.  Unremarkable prevertebral soft tissues.                                       Medications - No data to display    Medical Decision Making  Differential includes sprain, strain, fracture, dislocation, ligament/tendon injury    Problems Addressed:  Acute bilateral thoracic back pain: complicated acute illness or injury    Amount and/or Complexity of Data Reviewed  Radiology: ordered and independent interpretation performed.    Risk  Risk Details: CT thoracic & cervical spine x-ray  ordered in the emergency room  CT cervical & thoracic spine showed no acute findings in ED tonight  Prescriptions given, follow-up with orthopedics on discharge outpatient    Final diagnoses:  [M54.6] Acute bilateral thoracic back pain (Primary)          ED Disposition Condition    Discharge Stable          ED Prescriptions       Medication Sig Dispense Start Date End Date Auth. Provider    ibuprofen (ADVIL,MOTRIN) 800 MG tablet Take 1 tablet (800 mg total) by mouth every 6 (six) hours as needed for Pain. 20 tablet 6/17/2025 -- Neville Drew MD    cyclobenzaprine (FLEXERIL) 10 MG tablet Take 1 tablet (10 mg total) by mouth 3 (three) times daily as needed for Muscle spasms. 10 tablet 6/17/2025 6/22/2025 Neville Drew MD    gabapentin (NEURONTIN) 100 MG capsule Take 1 capsule (100 mg total) by mouth 3 (three) times daily. 90 capsule 6/17/2025 6/17/2026 Neville Drew MD          Follow-up Information       Follow up With Specialties Details Why Contact Info    Raciel Mendez MD Family Medicine Go in 2 days  [address] [facility] [address]  [phone]                     [1]   Social History  Tobacco Use    Smoking status: Never    Smokeless tobacco: Current   Substance Use Topics    Alcohol use: Yes     Comment: socially    Drug use: No        Neville Drew MD  06/18/25 [id number]

## 2025-06-24 ENCOUNTER — OFFICE VISIT (OUTPATIENT)
Dept: FAMILY MEDICINE | Facility: CLINIC | Age: 50
End: 2025-06-24
Payer: COMMERCIAL

## 2025-06-24 VITALS
SYSTOLIC BLOOD PRESSURE: 150 MMHG | HEART RATE: 85 BPM | RESPIRATION RATE: 14 BRPM | OXYGEN SATURATION: 99 % | BODY MASS INDEX: 39.57 KG/M2 | DIASTOLIC BLOOD PRESSURE: 92 MMHG | WEIGHT: 252.13 LBS | HEIGHT: 67 IN

## 2025-06-24 DIAGNOSIS — R03.0 ELEVATED BLOOD PRESSURE READING IN OFFICE WITHOUT DIAGNOSIS OF HYPERTENSION: ICD-10-CM

## 2025-06-24 DIAGNOSIS — M54.2 NECK PAIN: Primary | ICD-10-CM

## 2025-06-24 PROCEDURE — 99203 OFFICE O/P NEW LOW 30 MIN: CPT | Mod: S$GLB,,, | Performed by: STUDENT IN AN ORGANIZED HEALTH CARE EDUCATION/TRAINING PROGRAM

## 2025-06-24 PROCEDURE — 99999 PR PBB SHADOW E&M-EST. PATIENT-LVL IV: CPT | Mod: PBBFAC,,, | Performed by: STUDENT IN AN ORGANIZED HEALTH CARE EDUCATION/TRAINING PROGRAM

## 2025-06-24 PROCEDURE — 1159F MED LIST DOCD IN RCRD: CPT | Mod: CPTII,S$GLB,, | Performed by: STUDENT IN AN ORGANIZED HEALTH CARE EDUCATION/TRAINING PROGRAM

## 2025-06-24 PROCEDURE — 3080F DIAST BP >= 90 MM HG: CPT | Mod: CPTII,S$GLB,, | Performed by: STUDENT IN AN ORGANIZED HEALTH CARE EDUCATION/TRAINING PROGRAM

## 2025-06-24 PROCEDURE — 3008F BODY MASS INDEX DOCD: CPT | Mod: CPTII,S$GLB,, | Performed by: STUDENT IN AN ORGANIZED HEALTH CARE EDUCATION/TRAINING PROGRAM

## 2025-06-24 PROCEDURE — 3077F SYST BP >= 140 MM HG: CPT | Mod: CPTII,S$GLB,, | Performed by: STUDENT IN AN ORGANIZED HEALTH CARE EDUCATION/TRAINING PROGRAM

## 2025-06-24 RX ORDER — GABAPENTIN 300 MG/1
300 CAPSULE ORAL 3 TIMES DAILY
Qty: 90 CAPSULE | Refills: 1 | Status: SHIPPED | OUTPATIENT
Start: 2025-06-24 | End: 2026-06-24

## 2025-06-24 RX ORDER — METHOCARBAMOL 750 MG/1
750 TABLET, FILM COATED ORAL NIGHTLY PRN
Qty: 30 TABLET | Refills: 0 | Status: SHIPPED | OUTPATIENT
Start: 2025-06-24 | End: 2025-07-24

## 2025-06-24 NOTE — PROGRESS NOTES
Subjective:       Patient ID: Popeye Correa is a 50 y.o. male.    Chief Complaint: Establish Care (Patient is here today to establish care. )  Pt here for ER follow-up    Pt states he fell in the store and since that time has been having neck pain. Pt states pain is constant and described as throbbing. Pain worse with certain movements of the neck. He reports tingling and numbness of the left hand.   Per pt, he was walking to get bread and he slipped and fall on his back. There is a  involved.     Pt was seen in the ER 05/25/25 and 06/17/2025. He has been taking ibuprofen and gabapentin. He also was taking muscle relaxers.    06/17/25 CT cervical spine: Mild reversal normal cervical lordosis. Correlate for positioning, strain, or spasm     06/17/25 CT thoracic spine: no acute process    Review of Systems   Constitutional:  Negative for chills and fever.   HENT:  Negative for congestion and sore throat.    Respiratory:  Negative for cough and shortness of breath.    Cardiovascular:  Negative for chest pain.   Gastrointestinal:  Negative for abdominal pain, diarrhea, nausea and vomiting.   Genitourinary:  Negative for dysuria and hematuria.   Musculoskeletal:  Positive for neck pain and neck stiffness.   Neurological:  Negative for dizziness, syncope and light-headedness.       Objective:      Physical Exam  Vitals reviewed.   Constitutional:       General: He is not in acute distress.  HENT:      Head: Normocephalic and atraumatic.   Cardiovascular:      Rate and Rhythm: Normal rate and regular rhythm.      Heart sounds: Normal heart sounds. No murmur heard.  Pulmonary:      Effort: Pulmonary effort is normal. No respiratory distress.      Breath sounds: Normal breath sounds.   Musculoskeletal:      Cervical back: Neck supple.   Neurological:      General: No focal deficit present.      Mental Status: He is alert and oriented to person, place, and time.         Assessment:       1. Neck pain    2. Elevated  blood pressure reading in office without diagnosis of hypertension        Plan:           1. Neck pain  -     Ambulatory referral/consult to Pain Clinic; Future; Expected date: 07/01/2025  -     gabapentin (NEURONTIN) 300 MG capsule; Take 1 capsule (300 mg total) by mouth 3 (three) times daily.  Dispense: 90 capsule; Refill: 1  -     methocarbamoL (ROBAXIN) 750 MG Tab; Take 1 tablet (750 mg total) by mouth nightly as needed (muscle spasm).  Dispense: 30 tablet; Refill: 0  -     Ambulatory Referral/Consult to Physical Therapy    2. Elevated blood pressure reading in office without diagnosis of hypertension       Refer PT  Refer pain management  Increase gabapentin to 300mg TID  Start robaxin nightly PRN muscle spasm  RTC for worsening symptoms or failure to improve, or RTC PRN/as scheduled

## 2025-07-28 ENCOUNTER — OFFICE VISIT (OUTPATIENT)
Dept: PAIN MEDICINE | Facility: CLINIC | Age: 50
End: 2025-07-28
Payer: COMMERCIAL

## 2025-07-28 VITALS
DIASTOLIC BLOOD PRESSURE: 62 MMHG | SYSTOLIC BLOOD PRESSURE: 101 MMHG | HEART RATE: 71 BPM | HEIGHT: 66 IN | WEIGHT: 253 LBS | BODY MASS INDEX: 40.66 KG/M2 | OXYGEN SATURATION: 98 %

## 2025-07-28 DIAGNOSIS — M62.9 MUSCULOSKELETAL DISORDER INVOLVING UPPER TRAPEZIUS MUSCLE: ICD-10-CM

## 2025-07-28 DIAGNOSIS — M79.18 MYOFASCIAL PAIN SYNDROME OF THORACIC SPINE: ICD-10-CM

## 2025-07-28 DIAGNOSIS — M54.2 NECK PAIN: ICD-10-CM

## 2025-07-28 DIAGNOSIS — M79.18 CERVICAL MYOFASCIAL PAIN SYNDROME: ICD-10-CM

## 2025-07-28 DIAGNOSIS — M54.12 CERVICAL RADICULITIS: Primary | ICD-10-CM

## 2025-07-28 PROCEDURE — 99999 PR PBB SHADOW E&M-EST. PATIENT-LVL IV: CPT | Mod: PBBFAC,,, | Performed by: ANESTHESIOLOGY

## 2025-07-28 PROCEDURE — 3074F SYST BP LT 130 MM HG: CPT | Mod: CPTII,S$GLB,, | Performed by: ANESTHESIOLOGY

## 2025-07-28 PROCEDURE — 3078F DIAST BP <80 MM HG: CPT | Mod: CPTII,S$GLB,, | Performed by: ANESTHESIOLOGY

## 2025-07-28 PROCEDURE — 3008F BODY MASS INDEX DOCD: CPT | Mod: CPTII,S$GLB,, | Performed by: ANESTHESIOLOGY

## 2025-07-28 PROCEDURE — 1160F RVW MEDS BY RX/DR IN RCRD: CPT | Mod: CPTII,S$GLB,, | Performed by: ANESTHESIOLOGY

## 2025-07-28 PROCEDURE — 1159F MED LIST DOCD IN RCRD: CPT | Mod: CPTII,S$GLB,, | Performed by: ANESTHESIOLOGY

## 2025-07-28 PROCEDURE — 99204 OFFICE O/P NEW MOD 45 MIN: CPT | Mod: S$GLB,,, | Performed by: ANESTHESIOLOGY

## 2025-07-28 RX ORDER — GABAPENTIN 800 MG/1
800 TABLET ORAL 3 TIMES DAILY
Qty: 90 TABLET | Refills: 11 | Status: SHIPPED | OUTPATIENT
Start: 2025-07-28 | End: 2026-07-28

## 2025-07-28 RX ORDER — METHOCARBAMOL 750 MG/1
750 TABLET, FILM COATED ORAL 3 TIMES DAILY
COMMUNITY

## 2025-07-28 RX ORDER — DICLOFENAC SODIUM 75 MG/1
75 TABLET, DELAYED RELEASE ORAL 2 TIMES DAILY PRN
COMMUNITY
Start: 2025-04-07 | End: 2025-07-28

## 2025-07-28 NOTE — PROGRESS NOTES
New Patient Evaluation  [facility]    Popeye Correa  : 1975  Date: 2025     CHIEF COMPLAINT:  Neck Pain, Arm Pain, Shoulder Pain, and Hand Pain    Referring Physician: Neville Drew MD  Primary Care Physician: Ana, Primary Doctor    HPI:  This is a 50 y.o. male with a chief complaint of Neck Pain, Arm Pain, Shoulder Pain, and Hand Pain  . The patient has Past medical history/Past surgical history of  rheumatoid arthritis    Patient was evaluated and referred by  primary care provider for neck pain and thoracic pain   Reported fall event, he is filling a Lawsuit  Diabetic: No    Anticoagulation medications: None    Allergy To Iodine: No    Currently on Antibiotic: No    Pain Disability Index Review:      2025    11:06 AM   Last 3 PDI Scores   Pain Disability Index (PDI) 22       Current/ Ooriginal Description of Pain Symptoms:    History of Recent Fall or Trauma: yes may 2025. Fall event at the store  Onset: Chronic, 2025  Pain Location: neck  in addition to upper trapezius muscle pain associated with tenderness  Radiates/associated symptoms: BL UE to fingers, RT worse than Left  Pain is Getting worse over the last 3 months   The pain is intermittent.   The pain is described as aching and tingling.   Exacerbating factors: Sitting, Standing, Laying, Bending, Walking, Lifting, and Getting out of bed/chair.   Mitigating factors Medications.   Symptoms interfere with daily activity, sleeping and work.  The patient feels like symptoms have been worsening.   Patient denies night fever/night sweats, urinary incontinence, bowel incontinence, significant weight loss, significant motor weakness, and loss of sensations.    Pain score:   Current: 0/10  Best: 0/10  Worst: 10/10    Current pain medication:  Gabapentin 300mg, TID  Ibuprofen 800mg, PRN  Diclofenac 75mg, BID  Robaxin 750mg, TID    Current Narcotics/Opioid /benzo Medications:  Opioids- None  Benzodiazepines:  "No    UDS:  NA    PDMP:  Reviewed and consistent with medication use as prescribed.     Previous Chronic Pain Treatment History:  Six weeks of conservative therapy include: Physical Therapy/HEP/Physician Lead Exercise Program:  Over the past 12 months, Patient has done 0 sessions. Starting soon.   Is patient actively participating in home exercise program (HEP)/ physician led exercise program in the last 6 months: Yes.    Non-interventional Pain Therapy:  []Chiropractor.   []Acupuncture/Dry needle.  []TENS unit.  [x]Heat/ICE.  []Back Brace.    Medications previously tried:  NSAIDs: Ibuprofen (Advil/Motrin) and Diclofenac  Topical Agent: Yes  TCA/SSRI/SNRI: None  Anti-convulsants: Gabapentin   Muscle Relaxants: Robaxin (methocarbamol )  and Tizanidine (Zanaflex)  Opioids- None.    Interventional Pain Procedures:  N/A    Previous spine/Relevant joint surgery:  N/A  Surgical History:   has no past surgical history on file.  Medical History:   has a past medical history of Rheumatoid arthritis, unspecified.  Family History:  family history includes Bone cancer in his father; Cancer in his father; Hypertension in his father and mother.  Allergies:  Patient has no known allergies.   Social History/SUBSTANCE ABUSE HISTORY:   reports that he has never smoked. He uses smokeless tobacco. He reports current alcohol use. He reports current drug use. Drug: Marijuana.  LABS:  CBC  Lab Results   Component Value Date    WBC 7.91 05/30/2024    HGB 14.0 05/30/2024    HCT 42.6 05/30/2024     Coagulation Profile   Lab Results   Component Value Date     05/30/2024     No results found for: "PT", "PTT", "INR"  CMP:  BMP  Lab Results   Component Value Date     05/30/2024    K 3.8 05/30/2024     05/30/2024    CO2 25 05/30/2024    BUN 18 05/30/2024    CREATININE 1.0 05/30/2024    CALCIUM 9.1 05/30/2024    ANIONGAP 8 05/30/2024    EGFRNORACEVR >60 05/30/2024     Lab Results   Component Value Date    ALT 19 05/30/2024    " "AST 14 05/30/2024    ALKPHOS 59 05/30/2024    BILITOT 0.5 05/30/2024     HGBA1C:  No results found for: "LABA1C", "HGBA1C"    ROS:    Review of Systems   GENERAL:  No weight loss, malaise or fevers.  HEENT:   No recent changes in vision or hearing  NECK:  Negative for lumps, no difficulty with swallowing.  RESPIRATORY:  Negative for cough, wheezing or shortness of breath, patient denies any recent URI.  CARDIOVASCULAR:  Negative for chest pain or palpitations.  GI:  Negative for abdominal discomfort, blood in stools or black stools or change in bowel habits.  MUSCULOSKELETAL:  See HPI.  SKIN:  Negative for lesions, rash, and itching.  PSYCH:  No mood disorder or recent psychosocial stressors.   HEMATOLOGY/LYMPHOLOGY:  See the blood thinner sectioned in HPI.  NEURO:  See HPI  All other reviewed and negative other than HPI.    PHYSICAL EXAM:  VITALS: /62 (BP Location: Right arm, Patient Position: Sitting)   Pulse 71   Ht 5' 6" (1.676 m)   Wt 114.8 kg (253 lb)   SpO2 98%   BMI 40.84 kg/m²   Body mass index is 40.84 kg/m².  GENERAL: Well appearing, in no acute distress, alert and oriented x3, answers questions appropriately.   PSYCH: Flat affect.  SKIN: Skin color, texture, turgor normal, no rashes or lesions.  HEAD/FACE:  Normocephalic, atraumatic. Cranial nerves grossly intact.  CV: Regular rate  PULM: No evidence of respiratory difficulty, symmetric chest rise.  GI:  Soft and non-Distended.  NECK: (+++) pain to palpation over the mid trapezius muscle. Spurling:++. (+++) pain with neck flexion, extension, or lateral flexion, Muscle strength in RT UE 5/5 and Left UE 5/5, Right Hand  5/5, Left Hand  5/5  GAIT:  normal gait    DIAGNOSTIC STUDIES AND MEDICAL RECORDS REVIEW:  I have personally reviewed and interpreted relevant radiology reports and reviewed relevant records from other services in the EMR.    CT cervical spine 06/2025  Mild reversal normal cervical lordosis. No acute fracture or " listhesis. Intervertebral disc spaces and vertebral body heights are maintained. No significant spondylosis. No significant central canal or foraminal stenosis. Unremarkable prevertebral soft tissues   Clinical Impression:  This is a pleasant 50 y.o. male patient with PMH/PSH of  rheumatoid arthritis, presenting with worsening neck pain after fall event in addition to left upper extremity radiculitis below the elbow to the hand, patient has completed more than 6 weeks of home exercises no benefit, CT scan cervical spine showed no fracture, we would recommend MRI cervical spine due to his cervical radiculitis symptoms.     Encounter Diagnosis:  Popeye Correa is a 50 y.o. male with the following diagnoses based on history, exam, and imagin. Cervical radiculitis  - Ambulatory referral/consult to Orthopedics    2. Musculoskeletal disorder involving upper trapezius muscle    3. Myofascial pain syndrome of thoracic spine    4. Cervical myofascial pain syndrome    5. Neck pain  - Ambulatory referral/consult to Pain Clinic  - MRI Cervical Spine Without Contrast; Future       Treatment Plan:    Diagnostics/Referrals: MRI cervical spine    Medications:    NSAIDs: OTC  Topical Agent: No  TCA/SSRI/SNRI: None  Anti-convulsants: Gabapentin   increase to 800 mg TID  Muscle Relaxants: Robaxin (methocarbamol )   Opioids: None    Interventional Therapy:  may consider cervical epidural steroid injection.  Sedation: NA.  Anticoagulation medications: None -Clearance to stop Blood thinner: NA    Regarding the above interventions, the patient has been educated regarding the risks (including bleeding, infection, increased pain, nerve damage, or allergic reaction), benefits, and alternatives. The patient states he understands and is eager to proceed.    Physical Rehabilitation: Referral to Physical therapy for Cervical ROM, stretching, strengthening and a home exercise program    Patient Education: Counseled patient regarding the  importance of activity modification, I have stressed the importance of physical activity and a home exercise plan to help with pain and improve health.    Follow-up: 8 weeks.    May consider: TPI  for upper trapezius muscle pain    Aurora Arreola MD  Anesthesiologist  Interventional Pain Medicine  07/28/2025    Disclaimer:  This note was prepared using voice recognition system and is likely to have sound alike errors that may have been overlooked even after proof reading.  Please call me with any questions.

## 2025-07-28 NOTE — PROGRESS NOTES
New Patient Evaluation  [facility]    Popeye Correa  : 1975  Date: 2025     CHIEF COMPLAINT:  No chief complaint on file.    Referring Physician: Neville Drew MD  Primary Care Physician: Ana, Primary Doctor    HPI:  This is a 50 y.o. male with a chief complaint of No chief complaint on file.  . The patient has Past medical history/Past surgical history of  rheumatoid arthritis    Patient was evaluated and referred by  primary care provider for neck pain and thoracic pain     Reported fall event,   he is finding a  Law suit  Diabetic: {GAYes/No/NA:79218}    {Anticoagulation medications:55161}    Allergy To Iodine: {GAYes/No/NA:66265}    Currently on Antibiotic: {GAYes/No/NA:09692}    Pain Disability Index Review:       No data to display                Current/ Ooriginal Description of Pain Symptoms:    History of Recent Fall or Trauma: {GAYes/No/NA:32441}   Onset: Chronic, started ***  Pain Location: ***  Radiates/associated symptoms: ***.   Pain is Getting worse over the last *** months   The pain is {Intermittent/Continuous:61716}.   The pain is described as {Desc; pain character:20391}.   Exacerbating factors: {Causes; Pain:32980}.   Mitigating factors ***.   Symptoms interfere with daily activity, sleeping, and ***.   The patient feels like symptoms have been {IUW:85262}.   Patient {Denies / Reports:16562} {RED FLAGS:97644}.    Pain score:   Current: {PAIN 0-10:46894}/10  Best: {PAIN 0-10:17988}/10  Worst: {PAIN 0-10:51976}/10    Current pain medication:   Gabapentin   Ibuprofen    Current Narcotics/Opioid /benzo Medications:  Opioids- {GAopioid:85199}  Benzodiazepines: {GAYes/No/NA:78624}    UDS:  NA    PDMP:  Reviewed and consistent with medication use as prescribed.     Previous Chronic Pain Treatment History:  Six weeks of conservative therapy include: Physical Therapy/HEP/Physician Lead Exercise Program:  Over the past 12 months, Patient has done ***  "sessions.  PT response: {PT response:67823} Helpful.   Dates of the PT sessions: from *** to ***.  Is patient actively participating in home exercise program (HEP)/ physician led exercise program in the last 6 months: {GAYes/No/NA:61420}.    Non-interventional Pain Therapy:  []Chiropractor.   []Acupuncture/Dry needle.  []TENS unit.  []Heat/ICE.  []Back Brace.    Medications previously tried:  NSAIDs: {GANSIAD:51379}  Topical Agent: {GAYes/No/NA:14671}  TCA/SSRI/SNRI: {GATCA/SSRI/SNRI:70951}  Anti-convulsants: {GAAnticonvulsants:65886}  Muscle Relaxants: {GAmuscle Relaxant:19190}  Opioids- {GAopioid:19093}.    Interventional Pain Procedures:  ***    Previous spine/Relevant joint surgery:  ***  Surgical History:   has no past surgical history on file.  Medical History:   has a past medical history of Rheumatoid arthritis, unspecified.  Family History:  family history includes Bone cancer in his father; Cancer in his father; Hypertension in his father and mother.  Allergies:  Patient has no known allergies.   Social History/SUBSTANCE ABUSE HISTORY:   reports that he has never smoked. He uses smokeless tobacco. He reports current alcohol use. He reports current drug use. Drug: Marijuana.  LABS:  CBC  Lab Results   Component Value Date    WBC 7.91 05/30/2024    HGB 14.0 05/30/2024    HCT 42.6 05/30/2024     Coagulation Profile   Lab Results   Component Value Date     05/30/2024     No results found for: "PT", "PTT", "INR"  CMP:  BMP  Lab Results   Component Value Date     05/30/2024    K 3.8 05/30/2024     05/30/2024    CO2 25 05/30/2024    BUN 18 05/30/2024    CREATININE 1.0 05/30/2024    CALCIUM 9.1 05/30/2024    ANIONGAP 8 05/30/2024    EGFRNORACEVR >60 05/30/2024     Lab Results   Component Value Date    ALT 19 05/30/2024    AST 14 05/30/2024    ALKPHOS 59 05/30/2024    BILITOT 0.5 05/30/2024     HGBA1C:  No results found for: "LABA1C", "HGBA1C"    ROS:    Review of Systems   GENERAL:  No weight " loss, malaise or fevers.  HEENT:   No recent changes in vision or hearing  NECK:  Negative for lumps, no difficulty with swallowing.  RESPIRATORY:  Negative for cough, wheezing or shortness of breath, patient denies any recent URI.  CARDIOVASCULAR:  Negative for chest pain or palpitations.  GI:  Negative for abdominal discomfort, blood in stools or black stools or change in bowel habits.  MUSCULOSKELETAL:  See HPI.  SKIN:  Negative for lesions, rash, and itching.  PSYCH:  No mood disorder or recent psychosocial stressors.   HEMATOLOGY/LYMPHOLOGY:  See the blood thinner sectioned in HPI.  NEURO:  See HPI  All other reviewed and negative other than HPI.    PHYSICAL EXAM:  VITALS: There were no vitals taken for this visit.  There is no height or weight on file to calculate BMI.  GENERAL: Well appearing, in no acute distress, alert and oriented x3, answers questions appropriately.   PSYCH: Flat affect.  SKIN: Skin color, texture, turgor normal, no rashes or lesions.  HEAD/FACE:  Normocephalic, atraumatic. Cranial nerves grossly intact.  CV: Regular rate  PULM: No evidence of respiratory difficulty, symmetric chest rise.  GI:  Soft and non-Distended.  NECK: ({GA+:27915}) pain to palpation over the cervical paraspinous muscles. Spurling:{GA+:56910}. ({GA+:06543}) pain with neck flexion, extension, or lateral flexion, Muscle strength in RT UE ***/5 and Left UE ***/5, Right Hand  ***/5, Left Hand  ***/5  BACK/SIJ/HIP:  Lumbar Spine Exam:       Inspection: No erythema, bruising.       Palpation: ({GA+:82054}) TTP of lumbar paraspinals bilaterally      ROM:  Limited in flexion, extension, lateral bending.       ({GA+:80202}) Facet loading {GAHip:86856}      ({GA+:82847}) Straight Leg Raise, {GAHip:00953}      ({GA+:60411}) LISA, Tenderness over the PSIS, Yeoman test, {GAHip:74809}  Hip Exam:      Inspection: No gross deformity or apparent leg length discrepancy      Palpation:  No TTP to bilateral greater  "trochanteric bursas.       ROM:  *** limitation Due to pain in internal rotation, external rotation b/l  Neurologic Exam:     Alert. Speech is fluent and appropriate.     Strength: ***/5 in {GAHip:31260} hip flexion and knee extension     Sensation:  Grossly intact to light touch in bilateral lower extremities     Tone: No abnormality appreciated in bilateral lower extremities      GAIT: {GAgait:92396}    DIAGNOSTIC STUDIES AND MEDICAL RECORDS REVIEW:  I have personally reviewed and interpreted relevant radiology reports and reviewed relevant records from other services in the EMR.   ***  Clinical Impression:  This is a pleasant 50 y.o. male patient with PMH/PSH of ***, presenting with***.     We discussed the underlying diagnoses and multiple treatment options including non-opioid medications, interventional procedures, physical therapy, home exercise, core muscle enhancement, and weight loss.  The risks and benefits of each treatment option were discussed and all questions were answered.      Encounter Diagnosis:  Popeye Correa is a 50 y.o. male with the following diagnoses based on history, exam, and imaging:  There are no diagnoses linked to this encounter.     Treatment Plan:    Diagnostics/Referrals: {gaimage:22709}    Medications:    NSAIDs: {GANSIAD:47002}  Topical Agent: {GAYes/No/NA:85801}  TCA/SSRI/SNRI: {GATCA/SSRI/SNRI:59469}  Anti-convulsants: {GAAnticonvulsants:75034}  Muscle Relaxants: {GAmuscle Relaxant:28925}  Opioids: {GAopioid:46990::"None"}  Patient was educated about the risk and benefit of chronic opioid therapy including dependency, addiction, diversion, and opioid hyperalgesia.  Interventional Therapy: {GAProcedure:80262}.  Sedation: {GAsedation:70422}.  {Anticoagulation medications:16923} -Clearance to stop Blood thinner: {GAYes/No/NA:46513}    Regarding the above interventions, the patient has been educated regarding the risks (including bleeding, infection, increased pain, nerve damage, " or allergic reaction), benefits, and alternatives. The patient states he understands and is eager to proceed.    Physical Rehabilitation: {GAPT:69402}    Patient Education: Counseled patient regarding the importance of {:91314}, I have stressed the importance of physical activity and a home exercise plan to help with pain and improve health.    Follow-up: ***.    May consider:     Aurora Arreola MD  Anesthesiologist  Interventional Pain Medicine  07/28/2025    Disclaimer:  This note was prepared using voice recognition system and is likely to have sound alike errors that may have been overlooked even after proof reading.  Please call me with any questions.

## 2025-07-30 ENCOUNTER — HOSPITAL ENCOUNTER (OUTPATIENT)
Dept: RADIOLOGY | Facility: HOSPITAL | Age: 50
Discharge: HOME | End: 2025-07-30
Attending: ANESTHESIOLOGY
Payer: COMMERCIAL

## 2025-07-30 DIAGNOSIS — M54.2 NECK PAIN: ICD-10-CM

## 2025-07-30 PROCEDURE — 72141 MRI NECK SPINE W/O DYE: CPT | Mod: 26,,, | Performed by: RADIOLOGY

## 2025-07-30 PROCEDURE — 72141 MRI NECK SPINE W/O DYE: CPT | Mod: TC
